# Patient Record
Sex: FEMALE | NOT HISPANIC OR LATINO
[De-identification: names, ages, dates, MRNs, and addresses within clinical notes are randomized per-mention and may not be internally consistent; named-entity substitution may affect disease eponyms.]

---

## 2020-12-11 ENCOUNTER — TRANSCRIPTION ENCOUNTER (OUTPATIENT)
Age: 30
End: 2020-12-11

## 2021-10-19 ENCOUNTER — APPOINTMENT (OUTPATIENT)
Dept: PLASTIC SURGERY | Facility: CLINIC | Age: 31
End: 2021-10-19
Payer: SELF-PAY

## 2021-10-19 VITALS — HEIGHT: 64 IN | WEIGHT: 200 LBS | BODY MASS INDEX: 34.15 KG/M2

## 2021-10-19 DIAGNOSIS — Z72.89 OTHER PROBLEMS RELATED TO LIFESTYLE: ICD-10-CM

## 2021-10-19 DIAGNOSIS — Z78.9 OTHER SPECIFIED HEALTH STATUS: ICD-10-CM

## 2021-10-19 PROBLEM — Z00.00 ENCOUNTER FOR PREVENTIVE HEALTH EXAMINATION: Status: ACTIVE | Noted: 2021-10-19

## 2021-10-19 PROCEDURE — 99072 ADDL SUPL MATRL&STAF TM PHE: CPT

## 2021-10-19 PROCEDURE — 99202 OFFICE O/P NEW SF 15 MIN: CPT

## 2021-10-20 PROBLEM — Z72.89 CONSUMES ALCOHOL: Status: ACTIVE | Noted: 2021-10-19

## 2021-10-20 PROBLEM — Z78.9 NON-SMOKER: Status: ACTIVE | Noted: 2021-10-19

## 2021-10-20 NOTE — HISTORY OF PRESENT ILLNESS
[FreeTextEntry1] : 31 years old patient presents in the office for a right posterior earlobe keloid, noticed in January 2021, no prior treatment. denies itches or prior infection but it hurts. earlobe was pieced about 7 years ago. Keloid started to grow over past yr and grew rapidly.\par no h/o other keloids

## 2021-11-09 ENCOUNTER — APPOINTMENT (OUTPATIENT)
Dept: PLASTIC SURGERY | Facility: CLINIC | Age: 31
End: 2021-11-09
Payer: COMMERCIAL

## 2021-11-09 PROCEDURE — 99072 ADDL SUPL MATRL&STAF TM PHE: CPT

## 2021-11-09 PROCEDURE — 14060 TIS TRNFR E/N/E/L 10 SQ CM/<: CPT

## 2021-11-10 NOTE — PROCEDURE
[FreeTextEntry6] : Preopdx :  eight ear keloid\par Procedure: excisional and local tissue rearrangement, right ear 1.5x1cm\par Anesthesia: local 1% w/epi\par Specimens: to path on formalin\par No complications\par \par Summary:\par IC obtained.  Lesion demarcated with marking pen.  1%lido with epinephrine injected.  15 blade used to incise full thickness.    Anterior flap elevated and lesion debulked. Hemostasis obtained with cautery.   Flap elevated and advanced into place, 1.9alc2mm.  bacitracin placed. \par \par

## 2021-11-16 ENCOUNTER — APPOINTMENT (OUTPATIENT)
Dept: PLASTIC SURGERY | Facility: CLINIC | Age: 31
End: 2021-11-16
Payer: COMMERCIAL

## 2021-11-16 PROCEDURE — 99024 POSTOP FOLLOW-UP VISIT: CPT

## 2021-11-16 NOTE — HISTORY OF PRESENT ILLNESS
[FreeTextEntry1] : DOP 11/09/21  excision and reconstruction, keloid of right ear lobe. \par  No excessive bleeding. No fevers. No odor. No purulent discharge. No excessive pain.\par

## 2021-11-17 ENCOUNTER — TRANSCRIPTION ENCOUNTER (OUTPATIENT)
Age: 31
End: 2021-11-17

## 2021-12-07 ENCOUNTER — APPOINTMENT (OUTPATIENT)
Dept: PLASTIC SURGERY | Facility: CLINIC | Age: 31
End: 2021-12-07
Payer: MEDICAID

## 2021-12-07 PROCEDURE — 99024 POSTOP FOLLOW-UP VISIT: CPT

## 2021-12-07 PROCEDURE — 11900 INJECT SKIN LESIONS </W 7: CPT | Mod: 58

## 2021-12-08 NOTE — HISTORY OF PRESENT ILLNESS
[FreeTextEntry1] : Kenalog injection\par DOP 11/09/21 excision of keloid  and reconstruction, keloid of right ear lobe. \par  No excessive bleeding. No fevers. No odor. No purulent discharge. No excessive pain.\par \par

## 2021-12-28 ENCOUNTER — APPOINTMENT (OUTPATIENT)
Dept: PLASTIC SURGERY | Facility: CLINIC | Age: 31
End: 2021-12-28

## 2022-06-02 ENCOUNTER — ASOB RESULT (OUTPATIENT)
Age: 32
End: 2022-06-02

## 2022-06-02 ENCOUNTER — APPOINTMENT (OUTPATIENT)
Dept: ANTEPARTUM | Facility: CLINIC | Age: 32
End: 2022-06-02
Payer: MEDICAID

## 2022-06-02 PROCEDURE — 76811 OB US DETAILED SNGL FETUS: CPT

## 2022-06-02 PROCEDURE — 76817 TRANSVAGINAL US OBSTETRIC: CPT

## 2022-06-17 ENCOUNTER — APPOINTMENT (OUTPATIENT)
Dept: ANTEPARTUM | Facility: CLINIC | Age: 32
End: 2022-06-17
Payer: MEDICAID

## 2022-06-17 ENCOUNTER — APPOINTMENT (OUTPATIENT)
Dept: MATERNAL FETAL MEDICINE | Facility: CLINIC | Age: 32
End: 2022-06-17
Payer: MEDICAID

## 2022-06-17 ENCOUNTER — NON-APPOINTMENT (OUTPATIENT)
Age: 32
End: 2022-06-17

## 2022-06-17 ENCOUNTER — ASOB RESULT (OUTPATIENT)
Age: 32
End: 2022-06-17

## 2022-06-17 VITALS
HEART RATE: 100 BPM | RESPIRATION RATE: 18 BRPM | WEIGHT: 214 LBS | SYSTOLIC BLOOD PRESSURE: 124 MMHG | OXYGEN SATURATION: 98 % | HEIGHT: 64 IN | DIASTOLIC BLOOD PRESSURE: 62 MMHG | BODY MASS INDEX: 36.54 KG/M2

## 2022-06-17 DIAGNOSIS — Z87.59 PERSONAL HISTORY OF OTHER COMPLICATIONS OF PREGNANCY, CHILDBIRTH AND THE PUERPERIUM: ICD-10-CM

## 2022-06-17 DIAGNOSIS — Z86.32 PERSONAL HISTORY OF GESTATIONAL DIABETES: ICD-10-CM

## 2022-06-17 PROCEDURE — 99205 OFFICE O/P NEW HI 60 MIN: CPT

## 2022-06-17 PROCEDURE — 76816 OB US FOLLOW-UP PER FETUS: CPT

## 2022-06-17 RX ORDER — BLOOD-GLUCOSE METER
W/DEVICE EACH MISCELLANEOUS
Qty: 1 | Refills: 0 | Status: ACTIVE | COMMUNITY
Start: 2022-05-04

## 2022-06-17 RX ORDER — PRENATAL VIT NO.126/IRON/FOLIC 28MG-0.8MG
28-0.8 TABLET ORAL
Refills: 0 | Status: ACTIVE | COMMUNITY

## 2022-06-17 RX ORDER — BLOOD SUGAR DIAGNOSTIC
STRIP MISCELLANEOUS
Qty: 100 | Refills: 0 | Status: ACTIVE | COMMUNITY
Start: 2022-05-04

## 2022-06-20 RX ORDER — METFORMIN HYDROCHLORIDE 500 MG/1
500 TABLET, COATED ORAL
Qty: 1 | Refills: 1 | Status: ACTIVE | COMMUNITY
Start: 2022-06-17 | End: 1900-01-01

## 2022-06-20 RX ORDER — PROGESTERONE 100 MG/1
100 INSERT VAGINAL
Qty: 60 | Refills: 1 | Status: ACTIVE | COMMUNITY
Start: 2022-06-20 | End: 1900-01-01

## 2022-06-21 NOTE — FAMILY HISTORY
[Age 35+ During Pregnancy] : not 35 or over during pregnancy [Reported Family History Of Birth Defects] : no congenital heart defects [Leon-Sachs Carrier] : no Leon-Sachs [Family History] : no mental retardation/autism [Reported Family History Of Genetic Disease] : no history of child defect in child of baby father

## 2022-06-21 NOTE — VITALS
[US Date: ___] : ultrasound performed on [unfilled]. [GA= ___ Weeks] : Results were GA of [unfilled] weeks [GA= ___ Days] : and [unfilled] day(s) [KAREN by US (date): ___] : The calculated KAREN by US is [unfilled] [By US] : this is the final KAREN

## 2022-06-22 NOTE — CHIEF COMPLAINT
TSH slightly elevated. Continue present management, pt asymptomatic, will recheck TSH in 3 months. Pt to call if symptoms arise.    [G ___] : G [unfilled] [P ___] : P [unfilled] [de-identified] : gestational diabetes and short cervix

## 2022-06-22 NOTE — DISCUSSION/SUMMARY
[FreeTextEntry1] : At the time of consultation, we discussed the various maternal, fetal and  complications of gestational diabetes, including fetal growth abnormality, polyhydramnios,  labor/delivery, fetal distress, unexplained stillbirth, labor complications including shoulder dystocia and increased risk of  section and preeclampsia.  We also discussed the increased rate of NICU admission, delayed lung maturity,  hypoglycemia and other metabolic disturbances.  All of these complications are increased in the setting of suboptimal glucose control. She is aware that optimal glycemic control can help minimize these risks, but cannot eliminate them completely. Dilan has been following a carbohydrate consistent diet and logging BG values.  Review of available BG log reveals the majority of fasting values out of target range.  Approximately 20% of postprandial values are also above target range.  We reviewed target BG values in pregnancy, with the goal to maintain fasting blood sugars less than 95 and one hour postprandial values less than 140 with no value less than 60 and no value greater than 200. The importance of dietary compliance and regular exercise was reviewed. Given her current glycemic control, medical therapy is indicated.  We reviewed the option of Metformin versus insulin, with insulin being the preferred treatment in pregnancy.  After counseling, Dilan opted to begin Metformin 500 mg BID and a prescription was sent to her pharmacy.  She will continue follow up with nutrition at [facility] every two weeks and will follow up at our office on 7/15/22 for BG review and medication adjustment as indicated.  Recent ultrasound showed an AGA fetus with normal amniotic fluid volume and BPP.  Recommendations for fetal testing include serial assessment of fetal growth and weekly  testing at 32 weeks.  Delivery between 39 - 39  is recommended.  Finally, we discussed the importance of repeat GTT 6-12 weeks after delivery in order to test for persistent glucose intolerance outside of pregnancy, especially in light of her early diagnosis of GDM.  Dilan voiced understanding of the above recommendations and counseling.  \par \par We next reviewed the finding of short cervix on ultrasound.  Cervical length at the time of anatomy evaluation on 22 measures 1.5 cm.  Repeat ultrasound today again showed a sonographically short cervix measuring 1.6 cm with funneling.  The patient reports no abdominal pain and no symptoms of  labor.  A CL measurement of <2.5 cm at <28 weeks has been associated with an increased risk for  delivery.  Some reassurance was provided given her history of prior full term vaginal delivery.  We reviewed pregnancy management options and Dilan will begin vaginal progesterone therapy.  A prescription was sent to her pharmacy.  Serial ultrasound evaluation of cervical length is recommended in addition to the fetal testing outline above. She will schedule an appointment at our [place] office in 2 weeks and return to [place] in 4 weeks.  We reviewed the signs and symptoms of  labor and the need for prompt evaluation and treatment of any genitourinary infection.  She was advised to call your office immediately or present for evaluation should she have any vaginal bleeding, vaginal discharge, leakage of fluid, abdominal pressure or contractions

## 2022-06-22 NOTE — OB HISTORY
[Pregnancy History] : boy [___] : Delivery occurred at [unfilled] [Spontaneous] : Spontaneous conception [Definite:  ___ (Date)] : the last menstrual period was [unfilled] [Normal Amount/Duration] : was of a normal amount and duration [Regular Cycle Intervals] : periods have been regular [FreeTextEntry1] : Early diagnosis of GDM - tested early because of history\par Had diabetic education with [facility] - last visit 6/9/22\par  [Spotting Between  Menses] : no spotting between menses

## 2022-06-22 NOTE — DATA REVIEWED
[FreeTextEntry1] : 4/7/22 - \par \par 4/27/22 - /217/150/117\par Qnatal low risk male\par \par TAUS/TVUS 6/17/22 - cephalic, anterior placenta, images of fetal cardiac anatomy, hands and feet appear sonographically normal, images of fetal nose/lips and aortic arch remain suboptimal, CL 1.6 cm\par \par BG log 5/11-6/16\par Fating  (17 of 25 elevated)\par 1 hr postprandial (17 of 73 elevated)

## 2022-06-22 NOTE — HISTORY OF PRESENT ILLNESS
[FreeTextEntry1] : Dilan Gillis is a 32 y.o.  with LMP of 21 and EDC of 10/6/22 who presents at 24w1d for  consultation secondary to pregnancy complicated by gestational diabetes and short cervix.  Her BMI is 36.7 kg/m2.  Dilan had early GCT/GTT secondary to prior pregnancy with A1GDM.  She has had diabetic education and nutritional counseling and reports adjusting diet based on recommendations.  She has been logging fasting and 1 hour postprandial BG values without complication.  She feels well and reports no obstetrical or constitutional complaint.  Dilan confirms fetal movement.

## 2022-07-15 ENCOUNTER — ASOB RESULT (OUTPATIENT)
Age: 32
End: 2022-07-15

## 2022-07-15 ENCOUNTER — APPOINTMENT (OUTPATIENT)
Dept: ANTEPARTUM | Facility: CLINIC | Age: 32
End: 2022-07-15

## 2022-07-15 ENCOUNTER — APPOINTMENT (OUTPATIENT)
Dept: MATERNAL FETAL MEDICINE | Facility: CLINIC | Age: 32
End: 2022-07-15

## 2022-07-15 VITALS
BODY MASS INDEX: 36.02 KG/M2 | HEART RATE: 100 BPM | HEIGHT: 64 IN | OXYGEN SATURATION: 99 % | DIASTOLIC BLOOD PRESSURE: 52 MMHG | SYSTOLIC BLOOD PRESSURE: 124 MMHG | RESPIRATION RATE: 16 BRPM | WEIGHT: 211 LBS

## 2022-07-15 DIAGNOSIS — Z87.2 PERSONAL HISTORY OF DISEASES OF THE SKIN AND SUBCUTANEOUS TISSUE: ICD-10-CM

## 2022-07-15 DIAGNOSIS — O34.32 MATERNAL CARE FOR CERVICAL INCOMPETENCE, SECOND TRIMESTER: ICD-10-CM

## 2022-07-15 PROCEDURE — 76817 TRANSVAGINAL US OBSTETRIC: CPT

## 2022-07-15 PROCEDURE — 76816 OB US FOLLOW-UP PER FETUS: CPT

## 2022-07-15 PROCEDURE — 99215 OFFICE O/P EST HI 40 MIN: CPT | Mod: TH

## 2022-07-19 PROBLEM — O34.32 CERVICAL FUNNELING AFFECTING PREGNANCY IN SECOND TRIMESTER: Status: ACTIVE | Noted: 2022-06-17

## 2022-07-20 NOTE — OB HISTORY
[Pregnancy History] : boy [___] : Delivery occurred at [unfilled] [Spontaneous] : Spontaneous conception [Definite:  ___ (Date)] : the last menstrual period was [unfilled] [Normal Amount/Duration] : was of a normal amount and duration [Regular Cycle Intervals] : periods have been regular [FreeTextEntry1] : Early diagnosis of GDM - tested early because of history\par Diabetic education with [facility]\par Initial MFM consultation 6/17/22\par  [Spotting Between  Menses] : no spotting between menses

## 2022-07-20 NOTE — DATA REVIEWED
[FreeTextEntry1] : TAUS/TVUS 7/15/22 - cephalic, anterior placenta, EFW 1157 gm (31%), fetal AC 45%, CL 1.4 cm\par \par BG log 7/1-7/15\par Fating 90-96\par 1 hr postprandial 110-136

## 2022-07-20 NOTE — DISCUSSION/SUMMARY
[FreeTextEntry1] : At the time of consultation, we reviewed the various maternal, fetal and  complications of gestational diabetes. Dilan has been following a carbohydrate consistent diet and logging BG values.  Review of available BG log reveals improvement since initiation of Metformin therapy.  All postprandial values are within target range.  The majority of fasting values are between 90-95.  She is tolerating Metformin but does report some mild abdominal bloating and loose stool.  Given current glycemic control and fetal testing, recommend continuing Metformin at the current dose of 500 mg BID. We reviewed target BG values in pregnancy and the importance of dietary compliance and regular exercise. She will continue follow up with nutrition at [facility] every two weeks and will follow up at our office on 22 for BG review and medication adjustment as indicated.  Ultrasound today showed an AGA fetus with normal amniotic fluid volume and BPP.  Recommendations for fetal testing include serial assessment of fetal growth and weekly  testing at 32 weeks. We will attempt to schedule weekly testing closer to her home.  Delivery between 39 - 39  is recommended.  Finally, we discussed the importance of repeat GTT 6-12 weeks after delivery in order to test for persistent glucose intolerance outside of pregnancy, especially in light of her early diagnosis of GDM.  Dilan voiced understanding of the above recommendations and counseling.  \par \par Regarding the finding of short cervix, Dilan has initiated nightly vaginal progesterone.  Cervical length today is stable compared to prior imaging. Dilan reports no abdominal pain and no symptoms of  labor.  We reviewed the signs and symptoms of  labor and the need for prompt evaluation and treatment of any genitourinary infection.  She was advised to call your office immediately or present for evaluation should she have any vaginal bleeding, vaginal discharge, leakage of fluid, abdominal pressure or contractions. Vaginal progesterone therapy should be continued until 36-37 weeks gestation.

## 2022-07-20 NOTE — HISTORY OF PRESENT ILLNESS
[FreeTextEntry1] : Dilan Gillis is a 32 y.o.  with LMP of 21 and EDC of 10/6/22 who presents at 28w1d for follow up  consultation secondary to pregnancy complicated by A2GDM and short cervix.  Her BMI is 36.2 kg/m2.  Dilan has been logging fasting and 1 hour postprandial BG values without complication.  She reports compliance with Metformin 500 mg BID.  She feels well and reports no obstetrical or constitutional complaint.  She has been using vaginal progesterone nightly.  Andrine confirms fetal movement.

## 2022-08-05 ENCOUNTER — APPOINTMENT (OUTPATIENT)
Dept: ANTEPARTUM | Facility: CLINIC | Age: 32
End: 2022-08-05

## 2022-08-12 ENCOUNTER — APPOINTMENT (OUTPATIENT)
Dept: ANTEPARTUM | Facility: CLINIC | Age: 32
End: 2022-08-12

## 2022-08-12 ENCOUNTER — APPOINTMENT (OUTPATIENT)
Dept: MATERNAL FETAL MEDICINE | Facility: CLINIC | Age: 32
End: 2022-08-12

## 2022-08-12 ENCOUNTER — ASOB RESULT (OUTPATIENT)
Age: 32
End: 2022-08-12

## 2022-08-12 VITALS
HEART RATE: 95 BPM | DIASTOLIC BLOOD PRESSURE: 52 MMHG | RESPIRATION RATE: 16 BRPM | SYSTOLIC BLOOD PRESSURE: 116 MMHG | OXYGEN SATURATION: 99 % | HEIGHT: 64 IN | BODY MASS INDEX: 37.39 KG/M2 | WEIGHT: 219 LBS

## 2022-08-12 PROCEDURE — 76819 FETAL BIOPHYS PROFIL W/O NST: CPT

## 2022-08-12 PROCEDURE — 76820 UMBILICAL ARTERY ECHO: CPT

## 2022-08-12 PROCEDURE — 76816 OB US FOLLOW-UP PER FETUS: CPT

## 2022-08-12 PROCEDURE — 36415 COLL VENOUS BLD VENIPUNCTURE: CPT

## 2022-08-12 PROCEDURE — ZZZZZ: CPT

## 2022-08-12 PROCEDURE — 99214 OFFICE O/P EST MOD 30 MIN: CPT | Mod: TH

## 2022-08-12 NOTE — CHIEF COMPLAINT
[G ___] : G [unfilled] [P ___] : P [unfilled] [de-identified] : having gestational diabetes requiring metformin medication

## 2022-08-12 NOTE — ACTIVE PROBLEMS
[Hypertension] : no hypertension [Heart Disease] : no heart disease [Autoimmune Disease] : no autoimmune disease [Renal Disease] : no kidney disease, no UTI [Neurologic Disorder] : no neurologic disorder, no epilepsy [Psychiatric Disorders] : no psychiatric disorders [Hepatic Disorder] : no hepatitis, no liver disease [Depression] : no depression, no post partum depression [Thrombophlebitis] : no varicosities, no phlebitis [Thyroid Disorder] : no thyroid dysfunction [Trauma] : no trauma/violence [Blood Transfusion (___ Ml)] : no history of blood transfusion

## 2022-08-12 NOTE — OB HISTORY
[Pregnancy History] : boy [___] : Delivery occurred at [unfilled] [Spontaneous] : Spontaneous conception [Definite:  ___ (Date)] : the last menstrual period was [unfilled] [Normal Amount/Duration] : was of a normal amount and duration [Regular Cycle Intervals] : periods have been regular [FreeTextEntry1] : She had a maternal-fetal medicine consultation on June 17 and July 15, 2022 because of gestational diabetes and a shortened cervix. [Spotting Between  Menses] : no spotting between menses

## 2022-08-12 NOTE — DISCUSSION/SUMMARY
[FreeTextEntry1] : She is 32 weeks and 1 day gestation by early ultrasound dates.\par \par Regarding her gestational diabetes, she takes metformin 500 mg twice daily.  She states that she has been following the recommended diabetic diet. She performs fasting and 1 hour postprandial self glucose monitoring from the beginning of the meal.  She is not keeping a daily food diary.  My review of her glucose log book from 22 to 22 revealed normal postprandial glucose values. She had 9  glucose readings of 91 or 92 mg/dL. She had one fasting glucose of 94 with the remainder of the glucose values being < 90 mg/dL. I informed her that maintaining euglycemia is the most important factor associated with good  outcomes in pregnancies complicated by gestational diabetes. I told her that poor glucose control may cause fetal macrosomia, shoulder dystocia,  delivery, stillbirth,  respiratory distress syndrome and  metabolic complications such as hypoglycemia and hyperbilirubinemia. I told her that she is at risk for developing gestational hypertension or preeclampsia during the current pregnancy. I also told her that she is at risk for developing type 2 diabetes, metabolic syndrome and cardiovascular disease later in life.  I recommended having a 75 gram 2 hour OGTT approximately 6 - 8 weeks postpartum to determine whether she has impaired glucose tolerance or preexisting diabetes not diagnosed prior to the pregnancy. I gave her dietary counseling. I told her to eat three daily meals with 3 snacks to reduce postprandial glucose fluctuations. I gave her a food diary to write her daily food intake. She was advised to bring the food / glucose diary to her prenatal visits. She appears to have good glycemic control based on the self glucose monitoring results. I recommend weekly or twice weekly fetal testing until delivery. I gave her a referral to have a telehealth visit with our diabetes educator in approximately 2 weeks. I ordered a hemoglobin A1c level. I advised her to have a follow-up MFM visit in 4 weeks.

## 2022-08-15 LAB
ESTIMATED AVERAGE GLUCOSE: 120 MG/DL
HBA1C MFR BLD HPLC: 5.8 %

## 2022-08-19 ENCOUNTER — APPOINTMENT (OUTPATIENT)
Dept: ANTEPARTUM | Facility: CLINIC | Age: 32
End: 2022-08-19

## 2022-08-25 ENCOUNTER — ASOB RESULT (OUTPATIENT)
Age: 32
End: 2022-08-25

## 2022-08-25 ENCOUNTER — APPOINTMENT (OUTPATIENT)
Dept: MATERNAL FETAL MEDICINE | Facility: CLINIC | Age: 32
End: 2022-08-25

## 2022-08-25 VITALS — WEIGHT: 218 LBS | BODY MASS INDEX: 37.22 KG/M2 | HEIGHT: 64 IN

## 2022-08-25 PROCEDURE — G0108 DIAB MANAGE TRN  PER INDIV: CPT | Mod: 95

## 2022-08-26 ENCOUNTER — ASOB RESULT (OUTPATIENT)
Age: 32
End: 2022-08-26

## 2022-08-26 ENCOUNTER — APPOINTMENT (OUTPATIENT)
Dept: ANTEPARTUM | Facility: CLINIC | Age: 32
End: 2022-08-26

## 2022-08-26 PROCEDURE — 93976 VASCULAR STUDY: CPT

## 2022-08-26 PROCEDURE — ZZZZZ: CPT

## 2022-08-26 PROCEDURE — 76820 UMBILICAL ARTERY ECHO: CPT

## 2022-08-26 PROCEDURE — 76818 FETAL BIOPHYS PROFILE W/NST: CPT

## 2022-09-02 ENCOUNTER — ASOB RESULT (OUTPATIENT)
Age: 32
End: 2022-09-02

## 2022-09-02 ENCOUNTER — APPOINTMENT (OUTPATIENT)
Dept: ANTEPARTUM | Facility: CLINIC | Age: 32
End: 2022-09-02

## 2022-09-02 PROCEDURE — ZZZZZ: CPT

## 2022-09-02 PROCEDURE — 76820 UMBILICAL ARTERY ECHO: CPT

## 2022-09-02 PROCEDURE — 93976 VASCULAR STUDY: CPT

## 2022-09-02 PROCEDURE — 76818 FETAL BIOPHYS PROFILE W/NST: CPT

## 2022-09-09 ENCOUNTER — APPOINTMENT (OUTPATIENT)
Dept: ANTEPARTUM | Facility: CLINIC | Age: 32
End: 2022-09-09

## 2022-09-16 ENCOUNTER — APPOINTMENT (OUTPATIENT)
Dept: ANTEPARTUM | Facility: CLINIC | Age: 32
End: 2022-09-16

## 2022-09-16 ENCOUNTER — ASOB RESULT (OUTPATIENT)
Age: 32
End: 2022-09-16

## 2022-09-16 ENCOUNTER — APPOINTMENT (OUTPATIENT)
Dept: MATERNAL FETAL MEDICINE | Facility: CLINIC | Age: 32
End: 2022-09-16

## 2022-09-16 VITALS
HEIGHT: 64 IN | WEIGHT: 218 LBS | DIASTOLIC BLOOD PRESSURE: 68 MMHG | HEART RATE: 86 BPM | SYSTOLIC BLOOD PRESSURE: 108 MMHG | RESPIRATION RATE: 16 BRPM | BODY MASS INDEX: 37.22 KG/M2 | OXYGEN SATURATION: 99 %

## 2022-09-16 DIAGNOSIS — Z3A.37 37 WEEKS GESTATION OF PREGNANCY: ICD-10-CM

## 2022-09-16 DIAGNOSIS — O99.210 OBESITY COMPLICATING PREGNANCY, UNSPECIFIED TRIMESTER: ICD-10-CM

## 2022-09-16 DIAGNOSIS — O24.415 GESTATIONAL DIABETES MELLITUS IN PREGNANCY, CONTROLLED BY ORAL HYPOGLYCEMIC DRUGS: ICD-10-CM

## 2022-09-16 DIAGNOSIS — O26.879 CERVICAL SHORTENING, UNSPECIFIED TRIMESTER: ICD-10-CM

## 2022-09-16 PROCEDURE — 99215 OFFICE O/P EST HI 40 MIN: CPT | Mod: TH

## 2022-09-16 PROCEDURE — 76818 FETAL BIOPHYS PROFILE W/NST: CPT | Mod: 59

## 2022-09-16 PROCEDURE — ZZZZZ: CPT

## 2022-09-16 PROCEDURE — 76820 UMBILICAL ARTERY ECHO: CPT | Mod: 59

## 2022-09-16 PROCEDURE — 76816 OB US FOLLOW-UP PER FETUS: CPT

## 2022-09-21 PROBLEM — O24.415 GESTATIONAL DIABETES MELLITUS (GDM) CONTROLLED ON ORAL HYPOGLYCEMIC DRUG, ANTEPARTUM: Status: ACTIVE | Noted: 2022-06-17

## 2022-09-21 PROBLEM — Z3A.37 37 WEEKS GESTATION OF PREGNANCY: Status: ACTIVE | Noted: 2022-09-16

## 2022-09-21 PROBLEM — O99.210 OBESITY IN PREGNANCY: Status: ACTIVE | Noted: 2022-06-16

## 2022-09-21 PROBLEM — O26.879 CERVIX, SHORT (AFFECTING PREGNANCY): Status: ACTIVE | Noted: 2022-06-16

## 2022-09-21 NOTE — OB HISTORY
[Pregnancy History] : boy [___] : Delivery occurred at [unfilled] [Spontaneous] : Spontaneous conception [Definite:  ___ (Date)] : the last menstrual period was [unfilled] [Normal Amount/Duration] : was of a normal amount and duration [Regular Cycle Intervals] : periods have been regular [LMP: ___] : LMP: [unfilled] [KAREN: ___] : KAREN: [unfilled] [EGA: ___ wks] : EGA: [unfilled] wks [FreeTextEntry1] : Early diagnosis of GDM - tested early because of history\par Diabetic education with [facility]\par Initial MFM consultation 6/17/22\par Follow up MFM consultation 7/15/22 [Spotting Between  Menses] : no spotting between menses

## 2022-09-21 NOTE — DISCUSSION/SUMMARY
[FreeTextEntry1] : At the time of consultation, we again reviewed the various maternal, fetal and  complications of gestational diabetes. Dilan has been following a carbohydrate consistent diet and logging BG values.  Review of available BG log reveals all values within target range.  She was advised to continue Metformin at the current dose of 500 mg BID. We reviewed target BG values in pregnancy and the importance of dietary compliance and regular exercise. She will continue follow up with nutrition at [facility] every two weeks and has follow up with  will follow up at our diabetic educators on 22.  Ultrasound today showed an AGA fetus with normal amniotic fluid volume and BPP.  Recommendations for fetal testing include serial assessment of fetal growth and continued weekly  testing until delivery.  Delivery between 39 - 39  is recommended.  Finally, we discussed the importance of repeat GTT 6-12 weeks after delivery in order to test for persistent glucose intolerance outside of pregnancy, especially in light of her early diagnosis of GDM.  Dilan voiced understanding of the above recommendations and counseling.  \par \par Regarding the finding of short cervix, Dilan was advised she may discontinue nightly vaginal progesterone. Labor precautions and fetal kick counts were reviewed.  She reports some leakage of fluid and she was advised to present to [facility] [place] [place] for further evaluation.  All questions were  answered to the best of my ability.

## 2022-09-21 NOTE — HISTORY OF PRESENT ILLNESS
[FreeTextEntry1] : Dilan Gillis is a 32 y.o.  with LMP of 21 and EDC of 10/6/22 who presents at 37w1d for follow up  consultation secondary to pregnancy complicated by A2GDM and short cervix.  Her BMI is 37.4 kg/m2.  Dilan reports compliance with dietary recommendations and has been logging fasting and 1 hour postprandial BG values without complication.  She reports compliance with Metformin 500 mg BID.  She feels well and reports no obstetrical or constitutional complaint.  She has been using vaginal progesterone nightly.  Kerine confirms fetal movement.

## 2022-09-21 NOTE — DATA REVIEWED
[FreeTextEntry1] : TAUS 9/16/22 - cephalic, anterior placenta, EFW 2759 gm (22%), fetal AC 21%, MOI 8.15 cm, BPP 8/8, normal UA Doppler\par \par BG log 8/26-9/15\par Fating 87-91\par 1 hr postprandial 110-130

## 2022-09-22 ENCOUNTER — APPOINTMENT (OUTPATIENT)
Dept: MATERNAL FETAL MEDICINE | Facility: CLINIC | Age: 32
End: 2022-09-22

## 2022-09-23 ENCOUNTER — APPOINTMENT (OUTPATIENT)
Dept: ANTEPARTUM | Facility: CLINIC | Age: 32
End: 2022-09-23

## 2022-09-30 ENCOUNTER — APPOINTMENT (OUTPATIENT)
Dept: ANTEPARTUM | Facility: CLINIC | Age: 32
End: 2022-09-30

## 2022-10-07 ENCOUNTER — APPOINTMENT (OUTPATIENT)
Dept: ANTEPARTUM | Facility: CLINIC | Age: 32
End: 2022-10-07